# Patient Record
Sex: MALE | Race: WHITE | NOT HISPANIC OR LATINO | Employment: OTHER | URBAN - NONMETROPOLITAN AREA
[De-identification: names, ages, dates, MRNs, and addresses within clinical notes are randomized per-mention and may not be internally consistent; named-entity substitution may affect disease eponyms.]

---

## 2017-02-01 ENCOUNTER — TELEPHONE (OUTPATIENT)
Dept: ORTHOPEDIC SURGERY | Facility: CLINIC | Age: 62
End: 2017-02-01

## 2017-02-01 DIAGNOSIS — M54.5 LOW BACK PAIN, UNSPECIFIED BACK PAIN LATERALITY, UNSPECIFIED CHRONICITY, WITH SCIATICA PRESENCE UNSPECIFIED: Primary | ICD-10-CM

## 2017-02-01 NOTE — TELEPHONE ENCOUNTER
----- Message from Greg Oliver MD sent at 1/31/2017  8:30 AM CST -----  Will get it scheduled for him    ----- Message -----     From: Halina Fraga MA     Sent: 1/23/2017  11:29 AM       To: Greg Oliver MD    Last injection Lumbar transforaminal epidural steroid injection L5-S1, left  Side. Done on 12/7/2016. Is it okay to go ahead and schedule for another epidural or do you need to see the patient first?  ----- Message -----     From: Carmen Pina     Sent: 1/23/2017  11:23 AM       To: [facility]    PATIENT CALLED REQUESTING TO SCHEDULE AN EPIDURAL WITH DR OLIVER.  CELL # [phone]   HE IS AT WORK, LEAVE MESSAGE IF HE DOES NOT ANSWER.

## 2017-02-03 ENCOUNTER — TELEPHONE (OUTPATIENT)
Dept: ORTHOPEDIC SURGERY | Facility: CLINIC | Age: 62
End: 2017-02-03

## 2017-02-03 DIAGNOSIS — M47.817 SPONDYLOSIS OF LUMBOSACRAL JOINT WITHOUT MYELOPATHY: Primary | ICD-10-CM

## 2017-02-08 ENCOUNTER — HOSPITAL ENCOUNTER (OUTPATIENT)
Dept: INTERVENTIONAL RADIOLOGY/VASCULAR | Facility: HOSPITAL | Age: 62
Discharge: HOME | End: 2017-02-08
Admitting: ORTHOPAEDIC SURGERY

## 2017-02-08 DIAGNOSIS — M54.5 LOW BACK PAIN, UNSPECIFIED BACK PAIN LATERALITY, UNSPECIFIED CHRONICITY, WITH SCIATICA PRESENCE UNSPECIFIED: ICD-10-CM

## 2017-02-08 PROCEDURE — 0 IOPAMIDOL 41 % SOLUTION: Performed by: ORTHOPAEDIC SURGERY

## 2017-02-08 PROCEDURE — 25010000002 METHYLPREDNISOLONE PER 80 MG: Performed by: ORTHOPAEDIC SURGERY

## 2017-02-08 PROCEDURE — 64483 NJX AA&/STRD TFRM EPI L/S 1: CPT | Performed by: ORTHOPAEDIC SURGERY

## 2017-02-08 RX ORDER — BUPIVACAINE HYDROCHLORIDE 5 MG/ML
INJECTION, SOLUTION EPIDURAL; INTRACAUDAL
Status: COMPLETED | OUTPATIENT
Start: 2017-02-08 | End: 2017-02-08

## 2017-02-08 RX ORDER — METHYLPREDNISOLONE ACETATE 80 MG/ML
INJECTION, SUSPENSION INTRA-ARTICULAR; INTRALESIONAL; INTRAMUSCULAR; SOFT TISSUE
Status: COMPLETED | OUTPATIENT
Start: 2017-02-08 | End: 2017-02-08

## 2017-02-08 RX ADMIN — METHYLPREDNISOLONE ACETATE 80 MG: 80 INJECTION, SUSPENSION INTRA-ARTICULAR; INTRALESIONAL; INTRAMUSCULAR; SOFT TISSUE at 13:22

## 2017-02-08 RX ADMIN — BUPIVACAINE HYDROCHLORIDE 10 ML: 5 INJECTION, SOLUTION EPIDURAL; INTRACAUDAL; PERINEURAL at 13:22

## 2017-02-08 RX ADMIN — IOPAMIDOL 5 ML: 408 INJECTION, SOLUTION INTRATHECAL at 13:41

## 2017-02-08 NOTE — OP NOTE
DATE OF PROCEDURE:  02/08/2017    PREOPERATIVE DIAGNOSIS:  Lumbar spondylosis.     POSTOPERATIVE DIAGNOSIS:  Lumbar spondylosis.     PROCEDURE PERFORMED:  Lumbar transforaminal epidural steroid injection, L4-L5 left side.      SURGEON:  Greg Oliver MD     ANESTHESIA:  Bupivacaine 0.25%, 10 mL.      MEDICATIONS:  Depo-Medrol 80 mg.  I reviewed with the patient the potential risks associated with interventional pain management treatment including the risks of infection, epidural, hematoma, and spinal fluid leak and he understands and wishes to proceed.       DESCRIPTION OF PROCEDURE:  The patient was brought to the angiography suite. The patient was positioned prone on the angiography table. The lumbar region was cleansed with a chlorhexidine-based surgical scrub for sterilization. Surgical barriers were positioned in the usual fashion.  Bupivacaine 0.25%, 10 mL was then injected into the lumbar region for anesthetic effect and then the needle localization was performed with fluoroscopy to localize the foramen on the left side. The spinal needle was advanced to the level of the foramen on the left side. AP and lateral views confirmed positioning of the spinal needle and then contrast was injected and could be seen tracking medially into the L4-L5 foramen and into the paraspinous tissues on the left side. There was no indication of vascular uptake and at this time 80 mg of Depo-Medrol was injected into the foramen at L4-L5 on the left side. The spinal needle was withdrawn and a bandage was positioned. The patient tolerated the procedure well.     At the completion of the transforaminal epidural injection, the patient showed no signs of reaction to the medicine and was discharged after a brief period of observation.       CC:            Greg Oliver MD  Dictation Date/Time: 02/08/2017 14:59:51(Eastern Time Zone)  Transcribed Date/Time: 02/08/2017 16:21:31 (Eastern Time  Zone)  Dictator/ Initials:  [email or web address]  Document ID:                [id number]  Job ID: [id number]

## 2017-04-27 DIAGNOSIS — M51.36 DDD (DEGENERATIVE DISC DISEASE), LUMBAR: ICD-10-CM

## 2017-04-27 DIAGNOSIS — M54.16 LUMBAR RADICULOPATHY: Primary | ICD-10-CM

## 2017-05-03 ENCOUNTER — TELEPHONE (OUTPATIENT)
Dept: ORTHOPEDIC SURGERY | Facility: CLINIC | Age: 62
End: 2017-05-03

## 2017-05-03 DIAGNOSIS — M54.16 LUMBAR RADICULOPATHY: Primary | ICD-10-CM

## 2017-05-03 DIAGNOSIS — M51.36 DDD (DEGENERATIVE DISC DISEASE), LUMBAR: ICD-10-CM

## 2017-05-03 DIAGNOSIS — M54.5 LOW BACK PAIN, UNSPECIFIED BACK PAIN LATERALITY, UNSPECIFIED CHRONICITY, WITH SCIATICA PRESENCE UNSPECIFIED: Primary | ICD-10-CM

## 2017-07-05 ENCOUNTER — TELEPHONE (OUTPATIENT)
Dept: ORTHOPEDIC SURGERY | Facility: CLINIC | Age: 62
End: 2017-07-05

## 2017-12-11 DIAGNOSIS — M25.562 LEFT KNEE PAIN, UNSPECIFIED CHRONICITY: Primary | ICD-10-CM

## 2017-12-12 ENCOUNTER — OFFICE VISIT (OUTPATIENT)
Dept: ORTHOPEDIC SURGERY | Facility: CLINIC | Age: 62
End: 2017-12-12

## 2017-12-12 VITALS — HEIGHT: 68 IN | BODY MASS INDEX: 28.34 KG/M2 | WEIGHT: 187 LBS

## 2017-12-12 DIAGNOSIS — M25.562 LEFT LATERAL KNEE PAIN: ICD-10-CM

## 2017-12-12 DIAGNOSIS — K21.9 GERD WITHOUT ESOPHAGITIS: ICD-10-CM

## 2017-12-12 DIAGNOSIS — S83.272A COMPLEX TEAR OF LATERAL MENISCUS OF LEFT KNEE AS CURRENT INJURY, INITIAL ENCOUNTER: Primary | ICD-10-CM

## 2017-12-12 PROBLEM — S83.282A TEAR OF LATERAL MENISCUS OF LEFT KNEE, CURRENT: Status: ACTIVE | Noted: 2017-12-12

## 2017-12-12 PROCEDURE — 99214 OFFICE O/P EST MOD 30 MIN: CPT | Performed by: ORTHOPAEDIC SURGERY

## 2017-12-12 PROCEDURE — 20610 DRAIN/INJ JOINT/BURSA W/O US: CPT | Performed by: ORTHOPAEDIC SURGERY

## 2017-12-12 RX ORDER — METHYLPREDNISOLONE 4 MG/1
TABLET ORAL
COMMUNITY
Start: 2017-12-01 | End: 2018-02-22

## 2017-12-12 RX ORDER — MELOXICAM 7.5 MG/1
15 TABLET ORAL DAILY
Status: SHIPPED | OUTPATIENT
Start: 2017-12-12 | End: 2018-01-11

## 2017-12-12 RX ADMIN — TRIAMCINOLONE ACETONIDE 40 MG: 40 INJECTION, SUSPENSION INTRA-ARTICULAR; INTRAMUSCULAR at 12:10

## 2017-12-12 NOTE — PROGRESS NOTES
"Felipe Haywood is a 62 y.o. male   Primary provider:  Naren Ames MD       Chief Complaint   Patient presents with   • Left Knee - Consult       HISTORY OF PRESENT ILLNESS:   Patient is here for consult- left knee pain. Patient states he has previously sought treatment from his primary care physician Dr. Ames at [facility]. Patient states injury occured the week of the Thanksgiving holiday. Patient claims he was walking when he felt a \"pop\" followed by immediate pain to the medial and lateral aspect of the left knee. Patient states that Dr. Ames obtained xray and prescribed a Medrol Dose pack, which patient states did help slightly. Patient obtained MRI of the left knee on 12/04. Patient states he has had a previous medial meniscus repair performed to his left knee in 2016. Patient denies swelling or \"giving out\". Patient was sent to xray upon arrival.   Having sharp stabbing pains.  Pain with pivot and twist.  Having catching and locking    History of Present Illness     CONCURRENT MEDICAL HISTORY:    Past Medical History:   Diagnosis Date   • Cancer    • Depression        No Known Allergies      Current Outpatient Prescriptions:   •  MethylPREDNISolone (MEDROL, DEANDRA,) 4 MG tablet, follow package directions, Disp: , Rfl:   •  citalopram (CeleXA) 40 MG tablet, , Disp: , Rfl: 3  •  modafinil (PROVIGIL) 200 MG tablet, Take 200 mg by mouth., Disp: , Rfl:   •  omeprazole (prilOSEC) 10 MG capsule, Take 10 mg by mouth., Disp: , Rfl:   •  tamsulosin (FLOMAX) 0.4 MG capsule 24 hr capsule, , Disp: , Rfl: 1    Current Facility-Administered Medications:   •  meloxicam (MOBIC) tablet 15 mg, 15 mg, Oral, Daily, Duke Chilel MD    Past Surgical History:   Procedure Laterality Date   • KNEE SURGERY     • NOSE SURGERY         Family History   Problem Relation Age of Onset   • Heart disease Mother    • COPD Mother    • Heart disease Father    • COPD Father    • Osteoporosis Father        Social " "History     Social History   • Marital status:      Spouse name: N/A   • Number of children: N/A   • Years of education: N/A     Occupational History   • Not on file.     Social History Main Topics   • Smoking status: Never Smoker   • Smokeless tobacco: Never Used   • Alcohol use No   • Drug use: No   • Sexual activity: Defer     Other Topics Concern   • Not on file     Social History Narrative        Review of Systems   Constitutional: Negative for chills and fever.   Respiratory: Negative.    Cardiovascular: Negative.    Gastrointestinal: Negative.    All other systems reviewed and are negative.      PHYSICAL EXAMINATION:       Ht 172.7 cm (68\")  Wt 84.8 kg (187 lb)  BMI 28.43 kg/m2    Physical Exam   Constitutional: He is oriented to person, place, and time. He appears well-developed and well-nourished.   Musculoskeletal:        Left knee: He exhibits no effusion.   Neurological: He is alert and oriented to person, place, and time.   Psychiatric: He has a normal mood and affect. His behavior is normal. Judgment and thought content normal.       GAIT:     []  Normal  [x]  Antalgic    Assistive device: [x]  None  []  Walker     []  Crutches  []  Cane     []  Wheelchair  []  Stretcher    Right Knee Exam     Tenderness   The patient is experiencing no tenderness.         Range of Motion   The patient has normal right knee ROM.    Muscle Strength     The patient has normal right knee strength.    Tests   Lachman:  Anterior - negative      Drawer:       Anterior - negative      Varus: negative  Valgus: negative    Other   Erythema: absent  Sensation: normal  Pulse: present  Swelling: none      Left Knee Exam     Tenderness   The patient is experiencing tenderness in the lateral joint line and lateral retinaculum.    Range of Motion   Extension: -5   Flexion: 110     Tests   Lachman:  Anterior - negative      Drawer:       Anterior - negative       Varus: positive (no instability but very tender with a varus " stress.)  Valgus: negative    Other   Erythema: absent  Sensation: normal  Pulse: present  Swelling: none  Effusion: no effusion present              Impressions           1.  In the posteromedial aspect of the medial meniscus, there is an irregular, mildly distracted radial tear that was present 2 years ago and has not changed significantly.        2.  Chronically torn anterior cruciate ligament.        3.  There is a complex tear of the intercondylar aspect of the anterior horn of the lateral meniscus; this was probably present on the earlier study but there is more signal change currently and there may be an additional superimposed acute injury.        4.  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       MRI knee left without contrast (12/04/2017 10:17 AM)   Narrative   Indication:  Acute pain in left knee.  No specific injury.        MRI, Left Knee without Gadolinium:  No marrow edema or sign of acute bone injury.  The anterior cruciate ligament is chronically torn.  The posterior cruciate ligament is intact and has a normal position.  The collateral ligaments are intact.  There is     an angled, irregular complete radial tear in the posteromedial aspect of the medial meniscus; this was present on December 2015.  Near the tear, there is a small area of osteochondritis dissecans that is also old and the overlying cartilage is intact.      There is a mildly expanded intrasubstance tear of the intercondylar aspect of the anterior horn of the lateral meniscus near where the torn anterior cruciate ligament would attach.  No muscle injury is seen.        The patella is normally seated.  Along the upper lateral facet, there is thinning and irregularity of the cartilage; this is seen on the old study.  In addition, there is signal change in the lower medial patella with mild  irregularity of the cortical     margins; this is unchanged from 2 years ago.        The tendons have a normal appearance.  There is a small effusion.            MRI knee left without contrast (12/04/2017 10:17 AM)   Procedure Note   Román, Rad Results In - 12/04/2017 11:14 AM CST    Indication: Acute pain in left knee. No specific injury.    MRI, Left Knee without Gadolinium: No marrow edema or sign of acute bone injury. The anterior cruciate ligament is chronically torn. The posterior cruciate ligament is intact and has a normal position. The collateral ligaments are intact. There is   an angled, irregular complete radial tear in the posteromedial aspect of the medial meniscus; this was present on December 2015. Near the tear, there is a small area of osteochondritis dissecans that is also old and the overlying cartilage is intact.   There is a mildly expanded intrasubstance tear of the intercondylar aspect of the anterior horn of the lateral meniscus near where the torn anterior cruciate ligament would attach. No muscle injury is seen.    The patella is normally seated. Along the upper lateral facet, there is thinning and irregularity of the cartilage; this is seen on the old study.  In addition, there is signal change in the lower medial patella with mild irregularity of the cortical   margins; this is unchanged from 2 years ago.    The tendons have a normal appearance. There is a small effusion.     IMPRESSION:       1. In the posteromedial aspect of the medial meniscus, there is an irregular, mildly distracted radial tear that was present 2 years ago and has not changed significantly.    2. Chronically torn anterior cruciate ligament.    3. There is a complex tear of the intercondylar aspect of the anterior horn of the lateral meniscus; this was probably present on the earlier study but there is more signal change currently and there may be an additional superimposed acute injury.    4. 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       Large Joint Arthrocentesis  Date/Time: 12/12/2017 12:10 PM  Consent given by: patient  Site marked: site marked  Timeout: Immediately prior to procedure a time out was called to verify the correct patient, procedure, equipment, support staff and site/side marked as required   Supporting Documentation  Indications: pain   Procedure Details  Location: knee - L knee  Preparation: Patient was prepped and draped in the usual sterile fashion  Needle size: 22 G  Approach: anteromedial  Medications administered: 40 mg triamcinolone acetonide 40 MG/ML  Patient tolerance: patient tolerated the procedure well with no immediate complications                ASSESSMENT:    Diagnoses and all orders for this visit:    Complex tear of lateral meniscus of left knee as current injury, initial encounter  -     Large Joint Arthrocentesis  -     meloxicam (MOBIC) tablet 15 mg; Take 2 tablets by mouth Daily.    Left lateral knee pain  -     Large Joint Arthrocentesis  -     meloxicam (MOBIC) tablet 15 mg; Take 2 tablets by mouth Daily.    GERD without esophagitis    Other orders  -     MethylPREDNISolone (MEDROL, DEANDRA,) 4 MG tablet; follow package directions          PLAN    Injection left knee to see how symptoms respond  Mobilize as tolerated  No restrictions  Begin meloxicam  Will see how responds to injection and NSAIDS      Return in about 6 weeks (around 1/23/2018) for recheck.    Duke Chilel MD

## 2017-12-15 RX ORDER — TRIAMCINOLONE ACETONIDE 40 MG/ML
40 INJECTION, SUSPENSION INTRA-ARTICULAR; INTRAMUSCULAR
Status: COMPLETED | OUTPATIENT
Start: 2017-12-12 | End: 2017-12-12

## 2018-02-22 ENCOUNTER — OFFICE VISIT (OUTPATIENT)
Dept: ORTHOPEDIC SURGERY | Facility: CLINIC | Age: 63
End: 2018-02-22

## 2018-02-22 VITALS — HEIGHT: 68 IN | WEIGHT: 195 LBS | BODY MASS INDEX: 29.55 KG/M2

## 2018-02-22 DIAGNOSIS — S83.272D COMPLEX TEAR OF LATERAL MENISCUS OF LEFT KNEE AS CURRENT INJURY, SUBSEQUENT ENCOUNTER: Primary | ICD-10-CM

## 2018-02-22 DIAGNOSIS — M25.562 LEFT LATERAL KNEE PAIN: ICD-10-CM

## 2018-02-22 PROCEDURE — 20610 DRAIN/INJ JOINT/BURSA W/O US: CPT | Performed by: ORTHOPAEDIC SURGERY

## 2018-02-22 PROCEDURE — 99213 OFFICE O/P EST LOW 20 MIN: CPT | Performed by: ORTHOPAEDIC SURGERY

## 2018-02-22 RX ORDER — MELOXICAM 15 MG/1
TABLET ORAL
COMMUNITY
Start: 2018-01-22 | End: 2018-10-12

## 2018-02-22 RX ADMIN — LIDOCAINE HYDROCHLORIDE 2 ML: 10 INJECTION, SOLUTION EPIDURAL; INFILTRATION; INTRACAUDAL; PERINEURAL at 09:11

## 2018-02-22 RX ADMIN — TRIAMCINOLONE ACETONIDE 40 MG: 40 INJECTION, SUSPENSION INTRA-ARTICULAR; INTRAMUSCULAR at 09:11

## 2018-02-22 NOTE — PROGRESS NOTES
"Felipe Haywood is a 62 y.o. male returns for     Chief Complaint   Patient presents with   • Left Knee - Follow-up       HISTORY OF PRESENT ILLNESS:   Patient states that the steroid injection he got 12/12/17 has done really well; he is just now starting to have pain but not yet as bad as it was.   He has mild limping at times.  He has pain with pivoting and twisting.  He is unable to squat down.     CONCURRENT MEDICAL HISTORY:    Past Medical History:   Diagnosis Date   • Cancer    • Depression        No Known Allergies      Current Outpatient Prescriptions:   •  citalopram (CeleXA) 40 MG tablet, , Disp: , Rfl: 3  •  meloxicam (MOBIC) 15 MG tablet, , Disp: , Rfl:   •  modafinil (PROVIGIL) 200 MG tablet, Take 200 mg by mouth., Disp: , Rfl:   •  omeprazole (prilOSEC) 10 MG capsule, Take 10 mg by mouth., Disp: , Rfl:   •  tamsulosin (FLOMAX) 0.4 MG capsule 24 hr capsule, , Disp: , Rfl: 1    Past Surgical History:   Procedure Laterality Date   • KNEE SURGERY     • NOSE SURGERY         ROS  No fevers or chills.  No chest pain or shortness of air.  No GI or  disturbances.    PHYSICAL EXAMINATION:       Ht 172.7 cm (68\")  Wt 88.5 kg (195 lb)  BMI 29.65 kg/m2    Physical Exam   Constitutional: He is oriented to person, place, and time. He appears well-developed and well-nourished.   Musculoskeletal:        Left knee: He exhibits no effusion.   Neurological: He is alert and oriented to person, place, and time.   Psychiatric: He has a normal mood and affect. His behavior is normal. Judgment and thought content normal.       GAIT:     []  Normal  [x]  Antalgic    Assistive device: [x]  None  []  Walker     []  Crutches  []  Cane     []  Wheelchair  []  Stretcher    Left Knee Exam     Tenderness   The patient is experiencing tenderness in the lateral joint line and lateral retinaculum.    Range of Motion   Extension: -5   Flexion: 110     Tests   Lachman:  Anterior - negative      Drawer:       Anterior - negative     "   Varus: positive (no instability but very tender with a varus stress.)  Valgus: negative    Other   Erythema: absent  Sensation: normal  Pulse: present  Swelling: none  Effusion: no effusion present                Large Joint Arthrocentesis  Date/Time: 2/22/2018 9:11 AM  Consent given by: patient  Site marked: site marked  Timeout: Immediately prior to procedure a time out was called to verify the correct patient, procedure, equipment, support staff and site/side marked as required   Supporting Documentation  Indications: pain   Procedure Details  Location: knee - L knee  Preparation: Patient was prepped and draped in the usual sterile fashion  Needle size: 22 G  Approach: anteromedial  Medications administered: 40 mg triamcinolone acetonide 40 MG/ML; 2 mL lidocaine PF 1% 1 %  Patient tolerance: patient tolerated the procedure well with no immediate complications              ASSESSMENT:    Diagnoses and all orders for this visit:    Complex tear of lateral meniscus of left knee as current injury, subsequent encounter  -     Large Joint Arthrocentesis    Left lateral knee pain  -     Large Joint Arthrocentesis    Other orders  -     meloxicam (MOBIC) 15 MG tablet;           PLAN    Patient's BMI is above normal parameters. Follow-up plan includes:  exercise counseling and nutrition counseling.    We discussed trying a repeat injection into the left knee.  He will continue with slowly progressing activity as tolerated.  Continue strengthening and conditioning exercises.  We did discuss the possibility of arthroscopy if symptoms do not progress or if they worsen.    Return in about 8 weeks (around 4/19/2018) for recheck.    Duke Chilel MD

## 2018-02-24 RX ORDER — LIDOCAINE HYDROCHLORIDE 10 MG/ML
2 INJECTION, SOLUTION EPIDURAL; INFILTRATION; INTRACAUDAL; PERINEURAL
Status: COMPLETED | OUTPATIENT
Start: 2018-02-22 | End: 2018-02-22

## 2018-02-24 RX ORDER — TRIAMCINOLONE ACETONIDE 40 MG/ML
40 INJECTION, SUSPENSION INTRA-ARTICULAR; INTRAMUSCULAR
Status: COMPLETED | OUTPATIENT
Start: 2018-02-22 | End: 2018-02-22

## 2018-06-01 ENCOUNTER — OFFICE VISIT (OUTPATIENT)
Dept: ORTHOPEDIC SURGERY | Facility: CLINIC | Age: 63
End: 2018-06-01

## 2018-06-01 VITALS — BODY MASS INDEX: 27.89 KG/M2 | HEIGHT: 68 IN | WEIGHT: 184 LBS

## 2018-06-01 DIAGNOSIS — M75.101 ROTATOR CUFF SYNDROME OF RIGHT SHOULDER: Primary | ICD-10-CM

## 2018-06-01 DIAGNOSIS — S83.272D COMPLEX TEAR OF LATERAL MENISCUS OF LEFT KNEE AS CURRENT INJURY, SUBSEQUENT ENCOUNTER: ICD-10-CM

## 2018-06-01 DIAGNOSIS — M25.562 LEFT LATERAL KNEE PAIN: ICD-10-CM

## 2018-06-01 DIAGNOSIS — M75.41 IMPINGEMENT SYNDROME OF RIGHT SHOULDER: ICD-10-CM

## 2018-06-01 PROCEDURE — 99214 OFFICE O/P EST MOD 30 MIN: CPT | Performed by: ORTHOPAEDIC SURGERY

## 2018-06-01 NOTE — PROGRESS NOTES
"Felipe Haywood is a 62 y.o. male returns for     Chief Complaint   Patient presents with   • Left Knee - Follow-up       HISTORY OF PRESENT ILLNESS: Patient being seen for left knee follow up.   Patient has 3 main complaints on today's visit.  #1.  He states that his left knee is approximately the same.  He states that it gets stiff and then pops and then feels better.  His pain worsens during the day.  He has pain with pivoting and twisting and pain with stairs.  No new injury.  #2.  He has a questionable reinjury to his shoulder on the right side.  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  He states he began having worsening pain in his shoulder and it has not gotten any better over the last 2 months.  He has pain with activity, pain at night, and has been taking anti-inflammatory medicines as well as employing activity modification without significant improvement.  #3.  He has also developed some tenderness on the outside of his right elbow.  This is rather annoying but not significantly painful at this time.  No numbness or tingling.    He states that his shoulder is his biggest concern on today's visit.     CONCURRENT MEDICAL HISTORY:    Past Medical History:   Diagnosis Date   • Cancer    • Depression        No Known Allergies      Current Outpatient Prescriptions:   •  citalopram (CeleXA) 40 MG tablet, , Disp: , Rfl: 3  •  meloxicam (MOBIC) 15 MG tablet, , Disp: , Rfl:   •  modafinil (PROVIGIL) 200 MG tablet, Take 200 mg by mouth., Disp: , Rfl:   •  omeprazole (prilOSEC) 10 MG capsule, Take 10 mg by mouth., Disp: , Rfl:   •  tamsulosin (FLOMAX) 0.4 MG capsule 24 hr capsule, , Disp: , Rfl: 1    Past Surgical History:   Procedure Laterality Date   • KNEE SURGERY     • NOSE SURGERY         ROS  No fevers or chills.  No chest pain or shortness of air.  No GI or  disturbances.    PHYSICAL EXAMINATION:       Ht 172.7 cm (68\")   Wt " 83.5 kg (184 lb)   BMI 27.98 kg/m²     Physical Exam   Constitutional: He is oriented to person, place, and time. He appears well-developed and well-nourished.   Neurological: He is alert and oriented to person, place, and time.   Psychiatric: He has a normal mood and affect. His behavior is normal. Judgment and thought content normal.       GAIT:     [x]  Normal  []  Antalgic    Assistive device: [x]  None  []  Walker     []  Crutches  []  Cane     []  Wheelchair  []  Stretcher    Right Shoulder Exam     Tenderness   The patient is experiencing tenderness in the acromion.    Range of Motion   Active Abduction: 110 (160° with assistance.)   Forward Flexion: 120     Muscle Strength   Abduction: 3/5   Supraspinatus: 3/5     Tests   Hawkin's test: positive  Impingement: positive    Other   Erythema: absent  Sensation: normal  Pulse: present                AP bilateral standing with lateral of the left knee shows acceptable position and alignment with mild varus positioning and both knees.  No acute bony abnormality.  There appears to be a small osteochondral defect involving the medial femoral condyle of the left knee.  No sclerosis is noted.  The lateral of the left knee shows no patellofemoral disease.  Smooth articular cartilage.  No loose bodies.  12/12/17 at 12:37 PM by Duke Chilel MD      Xr Shoulder 2+ View Right    Result Date: 6/2/2018  Narrative: Ordering Provider:  Duke Chilel MD Ordering Diagnosis/Indication:  Impingement syndrome of right shoulder, Rotator cuff syndrome of right shoulder Procedure:  XR SHOULDER 2+ VW RIGHT Exam Date:  6/1/18 RELEVANT PRIOR IMAGES:  None COMPARISON:  Not applicable, no relevant images available.     Impression:  3 views of the right shoulder show acceptable position and alignment of the right shoulder with no evidence of acute bony abnormality.  There is evidence of a healed, remote, right clavicle fracture.  There also is evidence of prior distal  clavicle resection.  No fracture or dislocation is noted. Duke Chilel MD 6/1/18           ASSESSMENT:    Diagnoses and all orders for this visit:    Rotator cuff syndrome of right shoulder  -     XR Shoulder 2+ View Right  -     MRI shoulder right wo contrast; Future    Complex tear of lateral meniscus of left knee as current injury, subsequent encounter    Left lateral knee pain    Impingement syndrome of right shoulder  -     XR Shoulder 2+ View Right  -     MRI shoulder right wo contrast; Future          PLAN    Continue taking meloxicam for anti-inflammatory medication.   He has had a prior arthroscopy of the right shoulder and has reinjured his shoulder and has significant weakness and tenderness with motion and activity.  He is having pain at night.  He has tried anti-inflammatory medicine as well as activity modification and has given it approximately 8 weeks since his injury.  He has not made any progress in his pain and therefore an MRI of his right shoulder is warranted to assess for rotator cuff injury.    In addition, the patient has a prior protruding disc at L5 noted by MRI.  Patient requests evaluation by Dr. Chamberlain in [place] but who also has an office in [place].    Patient's Body mass index is 27.98 kg/m². BMI is above normal parameters. Recommendations include: exercise counseling and nutrition counseling.    Return for recheck for MRI results.      Duke Chilel MD

## 2018-06-05 DIAGNOSIS — M54.5 LOW BACK PAIN, UNSPECIFIED BACK PAIN LATERALITY, UNSPECIFIED CHRONICITY, WITH SCIATICA PRESENCE UNSPECIFIED: Primary | ICD-10-CM

## 2018-06-05 DIAGNOSIS — M54.16 LUMBAR RADICULOPATHY: ICD-10-CM

## 2018-06-05 DIAGNOSIS — M51.36 DDD (DEGENERATIVE DISC DISEASE), LUMBAR: ICD-10-CM

## 2018-06-13 ENCOUNTER — HOSPITAL ENCOUNTER (OUTPATIENT)
Dept: MRI IMAGING | Facility: HOSPITAL | Age: 63
Discharge: HOME | End: 2018-06-13
Attending: ORTHOPAEDIC SURGERY | Admitting: ORTHOPAEDIC SURGERY

## 2018-06-13 DIAGNOSIS — M75.41 IMPINGEMENT SYNDROME OF RIGHT SHOULDER: ICD-10-CM

## 2018-06-13 DIAGNOSIS — M75.101 ROTATOR CUFF SYNDROME OF RIGHT SHOULDER: ICD-10-CM

## 2018-06-13 PROCEDURE — 73221 MRI JOINT UPR EXTREM W/O DYE: CPT

## 2018-07-13 ENCOUNTER — OFFICE VISIT (OUTPATIENT)
Dept: ORTHOPEDIC SURGERY | Facility: CLINIC | Age: 63
End: 2018-07-13

## 2018-07-13 VITALS — BODY MASS INDEX: 28.14 KG/M2 | HEIGHT: 68 IN | WEIGHT: 185.7 LBS

## 2018-07-13 DIAGNOSIS — F33.9 RECURRENT MAJOR DEPRESSIVE DISORDER, REMISSION STATUS UNSPECIFIED (HCC): ICD-10-CM

## 2018-07-13 DIAGNOSIS — M75.41 IMPINGEMENT SYNDROME OF RIGHT SHOULDER: Primary | ICD-10-CM

## 2018-07-13 DIAGNOSIS — M75.101 ROTATOR CUFF SYNDROME OF RIGHT SHOULDER: ICD-10-CM

## 2018-07-13 PROCEDURE — 99213 OFFICE O/P EST LOW 20 MIN: CPT | Performed by: ORTHOPAEDIC SURGERY

## 2018-07-13 PROCEDURE — 20610 DRAIN/INJ JOINT/BURSA W/O US: CPT | Performed by: ORTHOPAEDIC SURGERY

## 2018-07-13 RX ADMIN — LIDOCAINE HYDROCHLORIDE 2 ML: 20 INJECTION, SOLUTION INFILTRATION; PERINEURAL at 08:49

## 2018-07-13 RX ADMIN — TRIAMCINOLONE ACETONIDE 40 MG: 40 INJECTION, SUSPENSION INTRA-ARTICULAR; INTRAMUSCULAR at 08:49
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
## 2018-07-14 RX ORDER — TRIAMCINOLONE ACETONIDE 40 MG/ML
40 INJECTION, SUSPENSION INTRA-ARTICULAR; INTRAMUSCULAR
Status: COMPLETED | OUTPATIENT
Start: 2018-07-13 | End: 2018-07-13

## 2018-07-14 RX ORDER — LIDOCAINE HYDROCHLORIDE 20 MG/ML
2 INJECTION, SOLUTION INFILTRATION; PERINEURAL
Status: COMPLETED | OUTPATIENT
Start: 2018-07-13 | End: 2018-07-13

## 2018-10-12 ENCOUNTER — OFFICE VISIT (OUTPATIENT)
Dept: ORTHOPEDIC SURGERY | Facility: CLINIC | Age: 63
End: 2018-10-12

## 2018-10-12 VITALS — HEIGHT: 68 IN | BODY MASS INDEX: 28.04 KG/M2 | WEIGHT: 185 LBS

## 2018-10-12 DIAGNOSIS — M75.101 ROTATOR CUFF SYNDROME OF RIGHT SHOULDER: ICD-10-CM

## 2018-10-12 DIAGNOSIS — M75.111 PARTIAL TEAR OF RIGHT ROTATOR CUFF: ICD-10-CM

## 2018-10-12 DIAGNOSIS — M75.41 IMPINGEMENT SYNDROME OF RIGHT SHOULDER: Primary | ICD-10-CM

## 2018-10-12 PROCEDURE — 20610 DRAIN/INJ JOINT/BURSA W/O US: CPT | Performed by: ORTHOPAEDIC SURGERY

## 2018-10-12 PROCEDURE — 99213 OFFICE O/P EST LOW 20 MIN: CPT | Performed by: ORTHOPAEDIC SURGERY

## 2018-10-12 RX ORDER — BUPROPION HYDROCHLORIDE 75 MG/1
75 TABLET ORAL 2 TIMES DAILY
COMMUNITY
End: 2020-07-17

## 2018-10-12 RX ADMIN — LIDOCAINE HYDROCHLORIDE 2 ML: 20 INJECTION, SOLUTION INFILTRATION; PERINEURAL at 09:24

## 2018-10-12 RX ADMIN — TRIAMCINOLONE ACETONIDE 40 MG: 40 INJECTION, SUSPENSION INTRA-ARTICULAR; INTRAMUSCULAR at 09:24

## 2018-10-12 NOTE — PROGRESS NOTES
"Felipe Haywood is a 63 y.o. male returns for     Chief Complaint   Patient presents with   • Right Shoulder - Follow-up       HISTORY OF PRESENT ILLNESS: Follow up on right shoulder pain. He was last injected on 07/13/2018. Patient would like another injection today. Patient states that his pain is worse at night because he sleeps on that side. Also has pain when trying to lift his arm.   Had back surgery about 8 weeks ago and is slowly improving.  Still having pain in shoulder with activity  Pain reaching across chest  No numbness or tingling       CONCURRENT MEDICAL HISTORY:    The following portions of the patient's history were reviewed and updated as appropriate: allergies, current medications, past family history, past medical history, past social history, past surgical history and problem list.     ROS  No fevers or chills.  No chest pain or shortness of air.  No GI or  disturbances.    PHYSICAL EXAMINATION:       Ht 172.7 cm (68\")   Wt 83.9 kg (185 lb)   BMI 28.13 kg/m²     Physical Exam   Constitutional: He is oriented to person, place, and time. He appears well-developed and well-nourished.   Neurological: He is alert and oriented to person, place, and time.   Psychiatric: He has a normal mood and affect. His behavior is normal. Judgment and thought content normal.       GAIT:     []  Normal  []  Antalgic    Assistive device: []  None  []  Walker     []  Crutches  []  Cane     []  Wheelchair  []  Stretcher    Right Shoulder Exam     Tenderness   The patient is experiencing tenderness in the acromioclavicular joint.    Range of Motion   Active Abduction: 170   Forward Flexion: 160     Muscle Strength   Abduction: 4/5   Supraspinatus: 4/5     Tests   Cross Arm: positive  Hawkin's test: positive    Other   Erythema: absent  Sensation: normal  Pulse: present                    MRI right shoulder without contrast on 6/13/2018      CLINICAL INDICATION: Right shoulder pain, history of rotator " cuff  repair several years ago, impingement syndrome     TECHNIQUE: Multiplanar, multisequence MR images are obtained  throughout the right shoulder without the administration of  contrast. This examination was performed on a 1.5 Clarita magnet.     COMPARISON: Right shoulder x-ray from 6/1/2018     FINDINGS: Partially imaged is an old healed right clavicle  diaphysis fracture. The patient is status post partial resection  of the right distal clavicle. No glenohumeral joint effusion is  noted. Biceps tendon appears intact and unremarkable. The  inferior glenohumeral ligament is intact and unremarkable. There  is some mild abnormal increased signal within the distal  supraspinatus tendon at the footplate consistent with a small  partial thickness rim rent tear involving the articular surface  of the distal supraspinatus tendon. This is seen on and coronal  images eight and nine of series 5 and seen best on sagittal image  four of series 8. This small partial thickness tear extends  approximately 9 mm from anterior to posterior. No evidence of a  full-thickness rotator cuff tear is noted. There is no muscular  atrophy or edema. No other evidence of rotator cuff tear is  noted. There is a small amount of fluid in the subacromial bursa  consistent with mild bursitis. The labrum appears intact without  evidence of a tear.     IMPRESSION:  1. Small partial thickness rim rent tear of the anterior distal  supraspinatus tendon at the footplate with no evidence of a  full-thickness or complete rotator cuff tear.  2. Subacromial bursitis.     Electronically signed by:  Mitch Ledezma  6/14/2018 10:52 AM  CDT Workstation: [id number]      Ordering Provider:  Duke Chilel MD  Ordering Diagnosis/Indication:  Impingement syndrome of right shoulder, Rotator cuff syndrome of right shoulder     Procedure:  XR SHOULDER 2+ VW RIGHT  Exam Date:  6/1/18     RELEVANT PRIOR IMAGES:    None     COMPARISON:  Not applicable, no  relevant images available.     IMPRESSION: 3 views of the right shoulder show acceptable position and alignment of the right shoulder with no evidence of acute bony abnormality.  There is evidence of a healed, remote, right clavicle fracture.  There also is evidence of prior distal clavicle resection.  No fracture or dislocation is noted.        Duke Chilel MD  6/1/18       Large Joint Arthrocentesis  Date/Time: 10/12/2018 9:24 AM  Consent given by: patient  Site marked: site marked  Timeout: Immediately prior to procedure a time out was called to verify the correct patient, procedure, equipment, support staff and site/side marked as required   Supporting Documentation  Indications: pain   Procedure Details  Location: shoulder - R glenohumeral  Preparation: Patient was prepped and draped in the usual sterile fashion  Needle size: 22 G  Approach: posterior  Medications administered: 40 mg triamcinolone acetonide 40 MG/ML; 2 mL lidocaine 2%  Patient tolerance: patient tolerated the procedure well with no immediate complications        ASSESSMENT:    Diagnoses and all orders for this visit:    Impingement syndrome of right shoulder  -     Large Joint Arthrocentesis    Rotator cuff syndrome of right shoulder  -     Large Joint Arthrocentesis    Partial tear of right rotator cuff    Other orders  -     buPROPion (WELLBUTRIN) 75 MG tablet; Take 75 mg by mouth 2 (Two) Times a Day.          PLAN    Continue with strength and conditioning exercises as tolerated.  Continue to progress activity  Conditioning  Discussed repeat steroid injection into right shoulder  Discussed possible arthroscopy if symptoms persist or worsen.    Patient's Body mass index is 28.13 kg/m². BMI is above normal parameters. Recommendations include: exercise counseling and nutrition counseling.  Return in about 6 weeks (around 11/23/2018) for recheck.    Duke Chilel MD

## 2018-10-14 RX ORDER — LIDOCAINE HYDROCHLORIDE 20 MG/ML
2 INJECTION, SOLUTION INFILTRATION; PERINEURAL
Status: COMPLETED | OUTPATIENT
Start: 2018-10-12 | End: 2018-10-12

## 2018-10-14 RX ORDER — TRIAMCINOLONE ACETONIDE 40 MG/ML
40 INJECTION, SUSPENSION INTRA-ARTICULAR; INTRAMUSCULAR
Status: COMPLETED | OUTPATIENT
Start: 2018-10-12 | End: 2018-10-12

## 2020-07-17 ENCOUNTER — OFFICE VISIT (OUTPATIENT)
Dept: CARDIOLOGY | Facility: CLINIC | Age: 65
End: 2020-07-17

## 2020-07-17 VITALS
OXYGEN SATURATION: 98 % | HEIGHT: 68 IN | HEART RATE: 87 BPM | SYSTOLIC BLOOD PRESSURE: 146 MMHG | BODY MASS INDEX: 30.77 KG/M2 | WEIGHT: 203 LBS | DIASTOLIC BLOOD PRESSURE: 78 MMHG

## 2020-07-17 DIAGNOSIS — E78.2 MIXED HYPERLIPIDEMIA: ICD-10-CM

## 2020-07-17 DIAGNOSIS — R00.2 PALPITATIONS: Primary | ICD-10-CM

## 2020-07-17 DIAGNOSIS — R06.09 DYSPNEA ON EXERTION: Primary | ICD-10-CM

## 2020-07-17 DIAGNOSIS — R00.2 PALPITATIONS: ICD-10-CM

## 2020-07-17 PROCEDURE — 99244 OFF/OP CNSLTJ NEW/EST MOD 40: CPT | Performed by: INTERNAL MEDICINE

## 2020-07-17 PROCEDURE — 93000 ELECTROCARDIOGRAM COMPLETE: CPT | Performed by: INTERNAL MEDICINE

## 2020-07-17 RX ORDER — ATORVASTATIN CALCIUM 20 MG/1
20 TABLET, FILM COATED ORAL
COMMUNITY
Start: 2020-06-09 | End: 2021-06-10

## 2020-07-17 NOTE — PROGRESS NOTES
Felipe Haywood  64 y.o. male    07/17/2020  1. Dyspnea on exertion    2. Palpitations    3. Mixed hyperlipidemia        History of Present Illness  Andrez Haywood is a 64-year-old male who is being seen by me for the first time.  He was referred by  for evaluation of multiple cardiac symptomatology.  The patient was seen by an in June 2020, when he complained of intermittent episodes of fluttering the chest which is been going on for more than a year.  This does not seem to be sustained and not associated with any chest pain.  He also noted that he has to take a deep breath from time to time and there is some degree of NYHA class II dyspnea on exertion.  He has a history of sleep apnea and uses CPAP on a regular basis and feels that many of the symptoms started after he began using CPAP.  He denies any chest pain on exertion or at rest.  He has no previous documented coronary artery disease, valvular heart disease, rheumatic fever and does not use any excessive amounts of caffeine.  No history of alcohol or drug use.  He has been treated for skin cancer in the past.  He believes he has gained weight after penitentiary and is deconditioned.  He has history of benign prostatic hypertrophy for which he is on medications.  He was noted to have hyperlipidemia and was recently started on atorvastatin.  LDL was 155 in June 2020.    He has a past medical history of Allergy, Anxiety, Bronchitis, Skin cancer, Continuous positive airway pressure dependence, Depression, Disease of nasal cavity and sinuses, Hypercholesteremia, and Sleep apnea.    EKG today showed sinus rhythm with heart rate of 81 bpm.  Normal axis.  No arrhythmia noted.  No ST-T wave changes suggestive of ischemia.    Clinical exam today was unremarkable with no signs of arrhythmia or congestive heart failure.    SUBJECTIVE    No Known Allergies      Past Medical History:   Diagnosis Date   • Cancer (CMS/[facility])    • Depression          Past  "Surgical History:   Procedure Laterality Date   • KNEE SURGERY     • NOSE SURGERY           Family History   Problem Relation Age of Onset   • Heart disease Mother    • COPD Mother    • Heart disease Father    • COPD Father    • Osteoporosis Father          Social History     Socioeconomic History   • Marital status:      Spouse name: Not on file   • Number of children: Not on file   • Years of education: Not on file   • Highest education level: Not on file   Tobacco Use   • Smoking status: Never Smoker   • Smokeless tobacco: Never Used   Substance and Sexual Activity   • Alcohol use: No   • Drug use: No   • Sexual activity: Defer         Current Outpatient Medications   Medication Sig Dispense Refill   • atorvastatin (LIPITOR) 20 MG tablet Take 20 mg by mouth.     • omeprazole (prilOSEC) 10 MG capsule Take 10 mg by mouth.     • tamsulosin (FLOMAX) 0.4 MG capsule 24 hr capsule   1     No current facility-administered medications for this visit.          OBJECTIVE    /78 (BP Location: Left arm, Patient Position: Sitting, Cuff Size: Adult)   Pulse 87   Ht 172.7 cm (68\")   Wt 92.1 kg (203 lb)   SpO2 98%   BMI 30.87 kg/m²         Review of Systems     Constitutional:  Denies recent weight loss, weight gain, fever or chills, no change in exercise tolerance     HENT:  Denies any hearing loss, epistaxis, hoarseness, or difficulty speaking.     Eyes: Wears eyeglasses or contact lenses     Respiratory:  Dyspnea with exertion,no cough, wheezing, or hemoptysis.     Cardiovascular: See HPI    Gastrointestinal:  Denies change in bowel habits, dyspepsia, ulcer disease, hematochezia, or melena.     Endocrine: Negative for cold intolerance, heat intolerance, polydipsia, polyphagia and polyuria.     Genitourinary: BPH      Musculoskeletal: Denies any history of arthritic symptoms or back problems     Skin: skin carcinoma    Allergic/Immunologic: Negative.  Negative for environmental allergies, food allergies and " immunocompromised state.     Neurological:  Denies any history of recurrent headaches, strokes, TIA, or seizure disorder.     Hematological: Denies any food allergies, seasonal allergies, bleeding disorders, or lymphadenopathy.     Psychiatric/Behavioral: history of anxiety/depression      Physical Exam     Constitutional: Cooperative, alert and oriented, well-developed, well-nourished, in no acute distress.     HENT:   Head: Normocephalic, normal hair patterns, no masses or tenderness.  Ears, Nose, and Throat: No gross abnormalities. No pallor or cyanosis. Dentition good.   Eyes: EOMS intact, PERRL, conjunctivae and lids unremarkable. Fundoscopic exam and visual fields not performed.   Neck: No palpable masses or adenopathy, no thyromegaly, no JVD, carotid pulses are full and equal bilaterally and without  Bruits.     Cardiovascular: Regular rhythm, S1 and S2 normal, no S3 or S4.  No murmurs, gallops, or rubs detected.     Pulmonary/Chest: Chest: normal symmetry, normal respiratory excursion, no intercostal retraction, no use of accessory muscles.            Pulmonary: Normal breath sounds. No rales or ronchi.    Abdominal: Abdomen soft, bowel sounds normoactive, no masses, no hepatosplenomegaly, non-tender, no bruits.     Musculoskeletal: No deformities, clubbing, cyanosis, erythema, or edema observed. There are no spinal abnormalities noted. Normal muscle strength and tone. Pulses full and equal in all extremities, no bruits auscultated.     Neurological: No gross motor or sensory deficits noted, affect appropriate, oriented to time, person, place.     Skin: Warm and dry to the touch, no apparent skin lesions or masses noted.     Psychiatric: He has a normal mood and affect. His behavior is normal. Judgment and thought content normal.         Procedures      Lab Results   Component Value Date    WBC 7.3 06/26/2018    HGB 15.4 06/26/2018    HCT 46.5 06/26/2018    MCV 88 06/26/2018     06/26/2018     Lab  Results   Component Value Date    BUN 18 06/26/2018    CREATININE 0.9 06/26/2018    CALCIUM 8.5 (L) 06/26/2018    ALBUMIN 3.8 06/26/2018    AST 15 06/26/2018    ALT 21 (L) 06/26/2018     Lab Results   Component Value Date    CHOL 240 (H) 06/09/2020    CHOL 224 (H) 06/26/2018     Lab Results   Component Value Date    TRIG 260 (H) 06/09/2020    TRIG 171 06/26/2018     Lab Results   Component Value Date    HDL 28 (L) 06/09/2020    HDL 35 06/26/2018     No components found for: LDLCALC  Lab Results   Component Value Date     (H) 06/09/2020     (H) 06/26/2018     No results found for: HDLLDLRATIO  No components found for: CHOLHDL  No results found for: HGBA1C  Lab Results   Component Value Date    TSH 0.67 06/26/2018           ASSESSMENT AND PLAN  Mr. Haywood is a 64-year-old male who is presented for evaluation of intermittent fluttering the chest, dyspnea on exertion in the background of obesity, sleep apnea, anxiety/depression, hyperlipidemia and positive family history for CAD.  Suspicion for sustained arrhythmias is low though PACs/PVCs are possible.  Electrolytes and thyroid function studies have been within normal limits.  He has been started on statins for hyperlipidemia.  The plan will be to proceed with an event monitor for 7 days to assess possible arrhythmia and an echocardiogram to assess left ventricular and valvular function and an exercise treadmill stress test to assess exercise tolerance and rule out coronary artery disease being arranged.  Aggressive risk factor modification recommended.  If his symptoms persist and no definite etiology is identified, checking the CPAP would be reasonable.  It is unlikely that use of CPAP itself is causing the problem.    Felipe was seen today for palpitations.    Diagnoses and all orders for this visit:    Dyspnea on exertion  -     Adult Transthoracic Echo Complete W/ Cont if Necessary Per Protocol; Future  -     Treadmill Stress Test;  Future    Palpitations  -     Mobile Cardiac Outpatient Telemetry; Future  -     Adult Transthoracic Echo Complete W/ Cont if Necessary Per Protocol; Future  -     Treadmill Stress Test; Future    Mixed hyperlipidemia  -     Adult Transthoracic Echo Complete W/ Cont if Necessary Per Protocol; Future  -     Treadmill Stress Test; Future        Patient's Body mass index is 30.87 kg/m². BMI is above normal parameters. Recommendations include: exercise counseling and nutrition counseling.      Felipe Haywood  reports that he has never smoked. He has never used smokeless tobacco.    Melisa Terrell MD  7/17/2020  08:49

## 2020-08-02 ENCOUNTER — APPOINTMENT (OUTPATIENT)
Dept: GENERAL RADIOLOGY | Facility: HOSPITAL | Age: 65
End: 2020-08-02

## 2020-08-02 ENCOUNTER — HOSPITAL ENCOUNTER (EMERGENCY)
Facility: HOSPITAL | Age: 65
Discharge: HOME | End: 2020-08-02
Attending: EMERGENCY MEDICINE | Admitting: EMERGENCY MEDICINE

## 2020-08-02 ENCOUNTER — TELEPHONE (OUTPATIENT)
Dept: EMERGENCY DEPT | Facility: HOSPITAL | Age: 65
End: 2020-08-02

## 2020-08-02 VITALS
RESPIRATION RATE: 18 BRPM | OXYGEN SATURATION: 92 % | SYSTOLIC BLOOD PRESSURE: 111 MMHG | WEIGHT: 200 LBS | HEIGHT: 67 IN | TEMPERATURE: 102 F | DIASTOLIC BLOOD PRESSURE: 62 MMHG | HEART RATE: 98 BPM | BODY MASS INDEX: 31.39 KG/M2

## 2020-08-02 DIAGNOSIS — N41.0 ACUTE PROSTATITIS: Primary | ICD-10-CM

## 2020-08-02 LAB
ALBUMIN SERPL-MCNC: 4.3 G/DL (ref 3.5–5.2)
ALBUMIN/GLOB SERPL: 2 G/DL
ALP SERPL-CCNC: 75 U/L (ref 39–117)
ALT SERPL W P-5'-P-CCNC: 24 U/L (ref 1–41)
ANION GAP SERPL CALCULATED.3IONS-SCNC: 11 MMOL/L (ref 5–15)
AST SERPL-CCNC: 21 U/L (ref 1–40)
BASOPHILS # BLD AUTO: 0.05 10*3/MM3 (ref 0–0.2)
BASOPHILS NFR BLD AUTO: 0.4 % (ref 0–1.5)
BILIRUB SERPL-MCNC: 0.8 MG/DL (ref 0–1.2)
BILIRUB UR QL STRIP: NEGATIVE
BUN SERPL-MCNC: 21 MG/DL (ref 8–23)
BUN/CREAT SERPL: 25 (ref 7–25)
CALCIUM SPEC-SCNC: 9.1 MG/DL (ref 8.6–10.5)
CHLORIDE SERPL-SCNC: 103 MMOL/L (ref 98–107)
CK SERPL-CCNC: 196 U/L (ref 20–200)
CLARITY UR: CLEAR
CO2 SERPL-SCNC: 25 MMOL/L (ref 22–29)
COLOR UR: YELLOW
CREAT SERPL-MCNC: 0.84 MG/DL (ref 0.76–1.27)
D-LACTATE SERPL-SCNC: 1.6 MMOL/L (ref 0.5–2)
DEPRECATED RDW RBC AUTO: 39.3 FL (ref 37–54)
EOSINOPHIL # BLD AUTO: 0.06 10*3/MM3 (ref 0–0.4)
EOSINOPHIL NFR BLD AUTO: 0.5 % (ref 0.3–6.2)
ERYTHROCYTE [DISTWIDTH] IN BLOOD BY AUTOMATED COUNT: 12.8 % (ref 12.3–15.4)
GFR SERPL CREATININE-BSD FRML MDRD: 92 ML/MIN/1.73
GLOBULIN UR ELPH-MCNC: 2.2 GM/DL
GLUCOSE SERPL-MCNC: 113 MG/DL (ref 65–99)
GLUCOSE UR STRIP-MCNC: NEGATIVE MG/DL
HCT VFR BLD AUTO: 42.9 % (ref 37.5–51)
HGB BLD-MCNC: 14.8 G/DL (ref 13–17.7)
HGB UR QL STRIP.AUTO: NEGATIVE
HOLD SPECIMEN: NORMAL
IMM GRANULOCYTES # BLD AUTO: 0.05 10*3/MM3 (ref 0–0.05)
IMM GRANULOCYTES NFR BLD AUTO: 0.4 % (ref 0–0.5)
KETONES UR QL STRIP: NEGATIVE
LEUKOCYTE ESTERASE UR QL STRIP.AUTO: NEGATIVE
LYMPHOCYTES # BLD AUTO: 0.84 10*3/MM3 (ref 0.7–3.1)
LYMPHOCYTES NFR BLD AUTO: 7 % (ref 19.6–45.3)
MCH RBC QN AUTO: 29.2 PG (ref 26.6–33)
MCHC RBC AUTO-ENTMCNC: 34.5 G/DL (ref 31.5–35.7)
MCV RBC AUTO: 84.6 FL (ref 79–97)
MONOCYTES # BLD AUTO: 0.74 10*3/MM3 (ref 0.1–0.9)
MONOCYTES NFR BLD AUTO: 6.1 % (ref 5–12)
NEUTROPHILS NFR BLD AUTO: 10.34 10*3/MM3 (ref 1.7–7)
NEUTROPHILS NFR BLD AUTO: 85.6 % (ref 42.7–76)
NITRITE UR QL STRIP: NEGATIVE
NRBC BLD AUTO-RTO: 0 /100 WBC (ref 0–0.2)
PH UR STRIP.AUTO: 6.5 [PH] (ref 5–9)
PLATELET # BLD AUTO: 194 10*3/MM3 (ref 140–450)
PMV BLD AUTO: 10.5 FL (ref 6–12)
POTASSIUM SERPL-SCNC: 3.7 MMOL/L (ref 3.5–5.2)
PROT SERPL-MCNC: 6.5 G/DL (ref 6–8.5)
PROT UR QL STRIP: NEGATIVE
RBC # BLD AUTO: 5.07 10*6/MM3 (ref 4.14–5.8)
SARS-COV-2 N GENE RESP QL NAA+PROBE: NOT DETECTED
SODIUM SERPL-SCNC: 139 MMOL/L (ref 136–145)
SP GR UR STRIP: 1.02 (ref 1–1.03)
UROBILINOGEN UR QL STRIP: NORMAL
WBC # BLD AUTO: 12.08 10*3/MM3 (ref 3.4–10.8)
WHOLE BLOOD HOLD SPECIMEN: NORMAL

## 2020-08-02 PROCEDURE — 87040 BLOOD CULTURE FOR BACTERIA: CPT | Performed by: EMERGENCY MEDICINE

## 2020-08-02 PROCEDURE — 25010000002 CEFTRIAXONE: Performed by: EMERGENCY MEDICINE

## 2020-08-02 PROCEDURE — 87635 SARS-COV-2 COVID-19 AMP PRB: CPT | Performed by: EMERGENCY MEDICINE

## 2020-08-02 PROCEDURE — 71045 X-RAY EXAM CHEST 1 VIEW: CPT

## 2020-08-02 PROCEDURE — 82550 ASSAY OF CK (CPK): CPT | Performed by: EMERGENCY MEDICINE

## 2020-08-02 PROCEDURE — 83605 ASSAY OF LACTIC ACID: CPT | Performed by: EMERGENCY MEDICINE

## 2020-08-02 PROCEDURE — 81003 URINALYSIS AUTO W/O SCOPE: CPT | Performed by: EMERGENCY MEDICINE

## 2020-08-02 PROCEDURE — 96365 THER/PROPH/DIAG IV INF INIT: CPT

## 2020-08-02 PROCEDURE — 99284 EMERGENCY DEPT VISIT MOD MDM: CPT

## 2020-08-02 PROCEDURE — 85025 COMPLETE CBC W/AUTO DIFF WBC: CPT | Performed by: EMERGENCY MEDICINE

## 2020-08-02 PROCEDURE — 80053 COMPREHEN METABOLIC PANEL: CPT | Performed by: EMERGENCY MEDICINE

## 2020-08-02 RX ORDER — LEVOFLOXACIN 500 MG/1
500 TABLET, FILM COATED ORAL DAILY
Qty: 14 TABLET | Refills: 0 | Status: SHIPPED | OUTPATIENT
Start: 2020-08-02 | End: 2020-08-16

## 2020-08-02 RX ORDER — SODIUM CHLORIDE 0.9 % (FLUSH) 0.9 %
10 SYRINGE (ML) INJECTION AS NEEDED
Status: DISCONTINUED | OUTPATIENT
Start: 2020-08-02 | End: 2020-08-02 | Stop reason: HOSPADM

## 2020-08-02 RX ORDER — LEVOFLOXACIN 500 MG/1
500 TABLET, FILM COATED ORAL ONCE
Status: COMPLETED | OUTPATIENT
Start: 2020-08-02 | End: 2020-08-02

## 2020-08-02 RX ORDER — IBUPROFEN 800 MG/1
800 TABLET ORAL ONCE
Status: COMPLETED | OUTPATIENT
Start: 2020-08-02 | End: 2020-08-02

## 2020-08-02 RX ADMIN — IBUPROFEN 800 MG: 800 TABLET ORAL at 02:25

## 2020-08-02 RX ADMIN — LEVOFLOXACIN 500 MG: 500 TABLET, FILM COATED ORAL at 03:39

## 2020-08-02 RX ADMIN — CEFTRIAXONE 2 G: 2 INJECTION, POWDER, FOR SOLUTION INTRAMUSCULAR; INTRAVENOUS at 02:57

## 2020-08-02 RX ADMIN — SODIUM CHLORIDE 1000 ML: 900 INJECTION, SOLUTION INTRAVENOUS at 02:37

## 2020-08-02 NOTE — ED PROVIDER NOTES
Subjective   64-year-old white male presents to the emergency department chief complaint of fever.  Patient relates he is ill with fever and chills since 11 PM.  Associated symptoms include dysuria and generalized body aches.  Patient relates history of similar symptoms in the past due to urinary tract infection.  Patient takes Flomax due to enlarged prostate.  Patient denies recent tick bite.  Patient took Tylenol prior to arrival.          Review of Systems   Constitutional: Positive for chills and fever. Negative for diaphoresis.   Respiratory: Negative for cough and shortness of breath.    Cardiovascular: Negative for chest pain.   Gastrointestinal: Negative for abdominal pain, nausea and vomiting.   Genitourinary: Positive for dysuria.   Musculoskeletal: Positive for arthralgias and myalgias. Negative for gait problem and neck stiffness.   Skin: Negative for rash.   Neurological: Positive for weakness. Negative for syncope and headaches.   All other systems reviewed and are negative.      Past Medical History:   Diagnosis Date   • Cancer (CMS/HCC)    • Depression        No Known Allergies    Past Surgical History:   Procedure Laterality Date   • KNEE SURGERY     • NOSE SURGERY         Family History   Problem Relation Age of Onset   • Heart disease Mother    • COPD Mother    • Heart disease Father    • COPD Father    • Osteoporosis Father        Social History     Socioeconomic History   • Marital status:      Spouse name: Not on file   • Number of children: Not on file   • Years of education: Not on file   • Highest education level: Not on file   Tobacco Use   • Smoking status: Never Smoker   • Smokeless tobacco: Never Used   Substance and Sexual Activity   • Alcohol use: No   • Drug use: No   • Sexual activity: Defer           Objective   Physical Exam   Constitutional: He is oriented to person, place, and time. He appears well-developed and well-nourished. No distress.   HENT:   Head: Normocephalic  and atraumatic.   Right Ear: External ear normal.   Left Ear: External ear normal.   Nose: Nose normal.   Mouth/Throat: Oropharynx is clear and moist.   Eyes: Pupils are equal, round, and reactive to light. Conjunctivae and EOM are normal.   Neck: Normal range of motion. Neck supple.   Cardiovascular: Regular rhythm, normal heart sounds and intact distal pulses.   Tachycardic.   Pulmonary/Chest: Effort normal and breath sounds normal.   Abdominal: Soft. Bowel sounds are normal. He exhibits no distension and no mass. There is no tenderness. There is no guarding.   Musculoskeletal: Normal range of motion. He exhibits no edema or tenderness.   Neurological: He is alert and oriented to person, place, and time. He exhibits normal muscle tone.   Skin: Skin is warm and dry. No rash noted. He is not diaphoretic.   Psychiatric: He has a normal mood and affect. His behavior is normal.   Nursing note and vitals reviewed.      Procedures           ED Course  ED Course as of Aug 02 0329   Sun Aug 02, 2020   0329 Patient is alert and resting comfortably. I reviewed the results of the emergency department evaluation with the patient.  I recommended urology and primary care follow-up.  I advised the patient to return to the emergency department if their symptoms change or worsen.     [DR]      ED Course User Index  [DR] Diego Taylor MD            Labs Reviewed   COMPREHENSIVE METABOLIC PANEL - Abnormal; Notable for the following components:       Result Value    Glucose 113 (*)     All other components within normal limits    Narrative:     GFR Normal >60  Chronic Kidney Disease <60  Kidney Failure <15     CBC WITH AUTO DIFFERENTIAL - Abnormal; Notable for the following components:    WBC 12.08 (*)     Neutrophil % 85.6 (*)     Lymphocyte % 7.0 (*)     Neutrophils, Absolute 10.34 (*)     All other components within normal limits   URINALYSIS W/ MICROSCOPIC IF INDICATED (NO CULTURE) - Normal    Narrative:     Urine microscopic not  indicated.   LACTIC ACID, PLASMA - Normal   CK - Normal   BLOOD CULTURE   BLOOD CULTURE   COVID-19,[facility] IN-HOUSE, NP SWAB IN TRANSPORT MEDIA 8-10 HR TAT   CBC AND DIFFERENTIAL    Narrative:     The following orders were created for panel order CBC & Differential.  Procedure                               Abnormality         Status                     ---------                               -----------         ------                     CBC Auto Differential[[id number]]        Abnormal            Final result                 Please view results for these tests on the individual orders.   EXTRA TUBES    Narrative:     The following orders were created for panel order Extra Tubes.  Procedure                               Abnormality         Status                     ---------                               -----------         ------                     Light Blue Top[[id number]]                                   In process                 Gold Top - SST[[id number]]                                   In process                   Please view results for these tests on the individual orders.   LIGHT BLUE TOP   GOLD TOP - SST     Xr Chest 1 View    Result Date: 8/2/2020  Narrative: CHEST X-RAY 1 VIEW on 8/2/2020 CLINICAL INDICATION: Fever COMPARISON: 7/2/2013 FINDINGS: The lungs are clear. Cardiac, hilar and mediastinal contours are within normal limits. Pulmonary vascularity is within normal limits. There is an old healed right clavicle fracture. No acute bony abnormality is noted.     Impression: No active disease. Electronically signed by:  Mitch Ledezma  8/2/2020 2:35 AM CDT Workstation: [id number]                                    [facility]    Final diagnoses:   Acute prostatitis            Diego Taylor MD  08/02/20 0329

## 2020-08-07 LAB
BACTERIA SPEC AEROBE CULT: NORMAL
BACTERIA SPEC AEROBE CULT: NORMAL

## 2020-08-12 ENCOUNTER — DOCUMENTATION (OUTPATIENT)
Dept: CARDIOLOGY | Facility: CLINIC | Age: 65
End: 2020-08-12

## 2020-08-12 NOTE — PROGRESS NOTES
Treadmill and echo ok per Dr Carias. Waiting on monitor results to determine plan of care. Patient aware

## 2020-08-18 ENCOUNTER — DOCUMENTATION (OUTPATIENT)
Dept: CARDIOLOGY | Facility: CLINIC | Age: 65
End: 2020-08-18

## 2020-08-18 DIAGNOSIS — R00.2 PALPITATIONS: Primary | ICD-10-CM

## 2020-08-18 DIAGNOSIS — R00.0 SINUS TACHYCARDIA SEEN ON CARDIAC MONITOR: ICD-10-CM

## 2020-08-18 RX ORDER — METOPROLOL SUCCINATE 25 MG/1
25 TABLET, EXTENDED RELEASE ORAL DAILY
Qty: 90 TABLET | Refills: 3 | Status: SHIPPED | OUTPATIENT
Start: 2020-08-18

## 2020-08-18 NOTE — PROGRESS NOTES
Patient treadmill and stress  Test ok per Dr Carias. Patient monitor showing sinus tachycardia as an average heart rate. Patient reports fluttering in chest at rest. RX for Toprol XL 25 mg sent per Dr Terrell. Patient notified

## 2020-08-18 NOTE — PROGRESS NOTES
Patient treadmill and stress  Test ok per Dr Carias. Patient monitor showing sinus tachycardia as an average heart rate. Patient reports fluttering in chest at rest. RX for Toprol XL 25 mg sent per Dr Terrell. Tried to call patient to let him know

## 2023-06-21 ENCOUNTER — HOSPITAL ENCOUNTER (OUTPATIENT)
Age: 68
Setting detail: SPECIMEN
Discharge: HOME | End: 2023-06-21